# Patient Record
Sex: MALE | Employment: FULL TIME | URBAN - NONMETROPOLITAN AREA
[De-identification: names, ages, dates, MRNs, and addresses within clinical notes are randomized per-mention and may not be internally consistent; named-entity substitution may affect disease eponyms.]

---

## 2024-01-09 ENCOUNTER — OFFICE VISIT (OUTPATIENT)
Dept: URGENT CARE | Facility: CLINIC | Age: 54
End: 2024-01-09

## 2024-01-09 VITALS
OXYGEN SATURATION: 96 % | RESPIRATION RATE: 16 BRPM | DIASTOLIC BLOOD PRESSURE: 60 MMHG | SYSTOLIC BLOOD PRESSURE: 126 MMHG | TEMPERATURE: 98 F | HEART RATE: 61 BPM

## 2024-01-09 DIAGNOSIS — R05.1 ACUTE COUGH: ICD-10-CM

## 2024-01-09 DIAGNOSIS — B34.9 VIRAL SYNDROME: Primary | ICD-10-CM

## 2024-01-09 LAB
SARS-COV-2 AG UPPER RESP QL IA: NEGATIVE
VALID CONTROL: NORMAL

## 2024-01-09 PROCEDURE — 87811 SARS-COV-2 COVID19 W/OPTIC: CPT | Performed by: PHYSICIAN ASSISTANT

## 2024-01-09 PROCEDURE — G0382 LEV 3 HOSP TYPE B ED VISIT: HCPCS | Performed by: PHYSICIAN ASSISTANT

## 2024-01-09 RX ORDER — DEXTROMETHORPHAN HYDROBROMIDE AND PROMETHAZINE HYDROCHLORIDE 15; 6.25 MG/5ML; MG/5ML
5 SYRUP ORAL 4 TIMES DAILY PRN
Qty: 60 ML | Refills: 0 | Status: SHIPPED | OUTPATIENT
Start: 2024-01-09

## 2024-01-09 NOTE — LETTER
January 9, 2024     Patient: Kenna Coleman   YOB: 1970   Date of Visit: 1/9/2024       To Whom it May Concern:    Kenna Coleman was seen in my clinic on 1/9/2024. He may return to work on 01/14/2024 .    If you have any questions or concerns, please don't hesitate to call.         Sincerely,          Pedro Pedersen PA-C        CC: No Recipients

## 2024-01-09 NOTE — PROGRESS NOTES
[facility]        NAME: Kenna Jared Jared is a 53 y.o. male  : 1970    MRN: [id number]  DATE: 2024  TIME: 3:51 PM    Assessment and Plan   Viral syndrome [B34.9]  1. Viral syndrome  promethazine-dextromethorphan (PHENERGAN-DM) 6.25-15 mg/5 mL oral syrup      2. Acute cough  Poct Covid 19 Rapid Antigen Test            Patient Instructions     Patient Instructions   Síndrome viral   CUIDADO AMBULATORIO:   Síndrome viral es un término usado para los síntomas de juan infección causado por un virus. Los virus se propagan fácilmente de juan persona a otra mediante los objetos que se comparten.  Los signos y síntomas podrían empezar de manera lenta o repentina y durar desde horas hasta farhat. Pueden ser de leves a graves y pueden cambiar en un periodo de días u horas. Puede presentar cualquiera de los siguientes signos o síntomas:  Fiebre y escalofríos    Congestión o goteo nasal    Tos, dolor de garganta y voz ronca    Dolor de rufino, o dolor y presión alrededor de rabia ojos    Dolor muscular y de las articulaciones    Falta de aliento o sibilancia    Dolor abdominal, calambres y diarrea    Náuseas, vómitos o pérdida del apetito    Llame al número local de emergencias (911 en los [place]), o pídale a alguien que llame si:  Usted sufre juan convulsión.    No es posible despertarlo.    Usted tiene dolor torácico y dificultad para respirar.    Busque atención médica de inmediato si:  Usted tiene rigidez en el ari, dolor de rufino intenso y sensibilidad a la bryant.    Usted se siente débil, mareado o confundido.    Usted felecia de orinar u orina menos de lo habitual.    Usted tose giuseppe o juan mucosidad espesa amarilla o santa.    Usted tiene dolor abdominal severo o lemus abdomen está más mary de lo habitual.    Llame a lemus médico si:  Rabia síntomas no mejoran con el tratamiento o empeoran después de 3 días.    Usted tiene salpullido o dolor de oído.    Usted siente ardor al  orinar.    Usted tiene preguntas o inquietudes acerca de lemus condición o cuidado.    El tratamiento para el síndrome viral podría incluir medicamentos para controlar caron síntomas. Para juan infección viral, no se administran antibióticos. Es posible que usted necesite alguno de los siguientes:  Acetaminofén digna el dolor y baja la fiebre. Está disponible sin receta médica. Pregunte la cantidad y la frecuencia con que debe tomarlos. Siga las indicaciones. Tana las etiquetas de todos los demás medicamentos que esté usando para saber si también contienen acetaminofén, o pregunte a lemus médico o farmacéutico. El acetaminofén puede causar daño en el hígado cuando no se rajesh de forma correcta.    BUCKY shereen el ibuprofeno, ayudan a disminuir la inflamación, el dolor y la fiebre. Los BUCKY pueden causar sangrado estomacal o problemas renales en ciertas personas. Si usted rajesh un medicamento anticoagulante, siempre pregúntele a lemus médico si los BUCKY son seguros para usted. Siempre tana la etiqueta de iesha medicamento y siga las instrucciones.    El medicamento para el resfriado ayuda a disminuir la inflamación, a controlar la tos y a aliviar la congestión del pecho o nasal.    El rociador nasal de agua salina ayuda a disminuir la congestión nasal.    El manejo de caron síntomas:  [place] líquidos shereen se le indique para evitar juan deshidratación. Pregunte cuánto líquido debe hillary cada día y cuáles líquidos son los más adecuados para usted. No tome líquidos con cafeína. La cafeína puede empeorar la deshidratación.    Descanse lo suficiente para ayudar a que lemus cuerpo sane. [place] siestas rohit el día. Pregunte a lemus médico cuándo puede regresar a lemus trabajo y a caron actividades cotidianas.    Use un humidificador de kassandra frío para aumentar el nivel de humedad en el aire de lemus hogar. [place] podría ayudarle a respirar más fácilmente y al mismo tiempo disminuir la tos.    Polly té con miel o use pastillas para la tos para el dolor de  garganta. Los caramelos para la tos están disponibles sin receta médica. Siga las indicaciones para hillary los caramelos para la tos.    No fume ni esté cerca a alguien que esté fumando. La nicotina y otras sustancias químicas que contienen los cigarrillos y cigarros pueden dañar los pulmones. El fumar también puede retrasar la sanación. Pida información a lemus médico si usted actualmente fuma y necesita ayuda para dejar de fumar. Los cigarrillos electrónicos o el tabaco sin humo igualmente contienen nicotina. Consulte con lemus médico antes de utilizar estos productos.    Prevenga la propagación de gérmenes:  Lávese las beatrice con frecuencia rohit el día. Utilice agua y jabón. [place] las beatrice enjabonadas, entrelazando los dedos, rohit al menos 20 segundos. Enjuáguese con agua corriente caliente. Séquese las beatrice con juan toalla limpia o juan toalla de papel. Puede usar un desinfectante para beatrice que contenga alcohol, si no hay agua y jabón disponibles. Enseñe a los niños a lavarse las beatrice y a usar el desinfectante de beatrice.         Cúbrase al toser y estornudar. Gire la jolanta y cúbrase la boca y la nariz con un pañuelo. Deseche el pañuelo. Use el ángulo del brazo si no tiene un pañuelo disponible. Luego lávese las beatrice con agua y jabón o use un desinfectante de beatrice. Enséñeles a los niños a cubrirse al toser o estornudar.    Quédese en casa mientras esté enfermo. Evite las multitudes lo más que pueda.    Vacúnese contra la influenza (gripe) tan pronto shereen se recomiende cada año. La vacuna antigripal se ofrece a partir de septiembre u octubre. Pregunte a lemus médico acerca de la vacuna contra la neumonía. Esta vacuna generalmente se recomienda cada 5 años en los adultos mayores.       Acuda a la consulta de control con lemus médico según las indicaciones: Anote caron preguntas para que se acuerde de hacerlas rohit caron visitas.  © Copyright Merative 2023 Information is for End User's use only and may not be sold,  redistributed or otherwise used for commercial purposes.  Esta información es sólo para uso en educación. Lemus intención no es darle un consejo médico sobre enfermedades o tratamientos. Colsulte con lemus médico, enfermera o farmacéutico antes de seguir cualquier régimen médico para saber si es seguro y efectivo para usted.        Follow up with PCP in 3-5 days.  Proceed to  ER if symptoms worsen.    Chief Complaint     Chief Complaint   Patient presents with    Diarrhea    Cough    Sore Throat    Fever    Nasal Congestion     Started 3 days ago  OTC cold medication  Request note for work         History of Present Illness       The patient presents the clinic for cough, sore throat, fever, nasal congestion and diarrhea for 3 days.    Diarrhea   Associated symptoms include chills, coughing, a fever, headaches and myalgias. Pertinent negatives include no vomiting.   Cough  Associated symptoms include chills, a fever, headaches, myalgias, postnasal drip, rhinorrhea and a sore throat. Pertinent negatives include no shortness of breath or wheezing.   Sore Throat   Associated symptoms include congestion, coughing, diarrhea and headaches. Pertinent negatives include no shortness of breath or vomiting.   Fever  Associated symptoms include chills, congestion, coughing, a fever, headaches, myalgias and a sore throat. Pertinent negatives include no nausea or vomiting.       Review of Systems   Review of Systems   Constitutional:  Positive for chills and fever.   HENT:  Positive for congestion, postnasal drip, rhinorrhea, sinus pressure, sinus pain and sore throat.    Respiratory:  Positive for cough. Negative for shortness of breath and wheezing.    Gastrointestinal:  Positive for diarrhea. Negative for nausea, rectal pain and vomiting.   Musculoskeletal:  Positive for myalgias.   Neurological:  Positive for headaches.         Current Medications       Current Outpatient Medications:     promethazine-dextromethorphan  (PHENERGAN-DM) 6.25-15 mg/5 mL oral syrup, Take 5 mL by mouth 4 (four) times a day as needed for cough, Disp: 60 mL, Rfl: 0    Current Allergies     Allergies as of 01/09/2024    (No Known Allergies)            The following portions of the patient's history were reviewed and updated as appropriate: allergies, current medications, past family history, past medical history, past social history, past surgical history and problem list.     History reviewed. No pertinent past medical history.    Past Surgical History:   Procedure Laterality Date    SHOULDER SURGERY         History reviewed. No pertinent family history.      Medications have been verified.        Objective   /60   Pulse 61   Temp 98 °F (36.7 °C)   Resp 16   SpO2 96%        Physical Exam     Physical Exam  Constitutional:       Appearance: He is well-developed.   HENT:      Head: Normocephalic.      Right Ear: No drainage.      Left Ear: No drainage.      Nose: Congestion and rhinorrhea present.   Eyes:      General:         Left eye: Discharge present.     Pupils: Pupils are equal, round, and reactive to light.   Neck:      Thyroid: No thyromegaly.      Trachea: No tracheal deviation.   Cardiovascular:      Rate and Rhythm: Normal rate and regular rhythm.      Heart sounds: No murmur heard.  Pulmonary:      Effort: Pulmonary effort is normal. No respiratory distress.      Breath sounds: Rhonchi present. No wheezing or rales.   Chest:      Chest wall: No tenderness.   Abdominal:      General: Bowel sounds are normal. There is no distension.      Palpations: Abdomen is soft. There is no mass.      Tenderness: There is no abdominal tenderness. There is no guarding or rebound.   Musculoskeletal:         General: Normal range of motion.      Cervical back: Normal range of motion.   Skin:     General: Skin is warm.   Neurological:      Mental Status: He is alert.               -Rapid COVID test is negative.  Symptoms likely viral.  I suggest  supportive treatment for now.  Patient will follow-up with PCP or go to the ER if symptoms worsen

## 2024-01-09 NOTE — PATIENT INSTRUCTIONS
Síndrome viral   CUIDADO AMBULATORIO:   Síndrome viral es un término usado para los síntomas de juan infección causado por un virus. Los virus se propagan fácilmente de juan persona a otra mediante los objetos que se comparten.  Los signos y síntomas podrían empezar de manera lenta o repentina y durar desde horas hasta farhat. Pueden ser de leves a graves y pueden cambiar en un periodo de días u horas. Puede presentar cualquiera de los siguientes signos o síntomas:  Fiebre y escalofríos    Congestión o goteo nasal    Tos, dolor de garganta y voz ronca    Dolor de rufino, o dolor y presión alrededor de rabia ojos    Dolor muscular y de las articulaciones    Falta de aliento o sibilancia    Dolor abdominal, calambres y diarrea    Náuseas, vómitos o pérdida del apetito    Llame al número local de emergencias (911 en [place]), o pídale a alguien que llame si:  Usted sufre juan convulsión.    No es posible despertarlo.    Usted tiene dolor torácico y dificultad para respirar.    Busque atención médica de inmediato si:  Usted tiene rigidez en el ari, dolor de rufino intenso y sensibilidad a la bryant.    Usted se siente débil, mareado o confundido.    Usted felecia de orinar u orina menos de lo habitual.    Usted tose giuseppe o ujan mucosidad espesa amarilla o santa.    Usted tiene dolor abdominal severo o lemus abdomen está más mary de lo habitual.    Llame a lemus médico si:  Rabia síntomas no mejoran con el tratamiento o empeoran después de 3 días.    Usted tiene salpullido o dolor de oído.    Usted siente ardor al orinar.    Usted tiene preguntas o inquietudes acerca de lemus condición o cuidado.    El tratamiento para el síndrome viral podría incluir medicamentos para controlar rabia síntomas. Para juan infección viral, no se administran antibióticos. Es posible que usted necesite alguno de los siguientes:  Acetaminofén digna el dolor y baja la fiebre. Está disponible sin receta médica. Pregunte la cantidad y la frecuencia con  que debe tomarlos. Siga las indicaciones. Tana las etiquetas de todos los demás medicamentos que esté usando para saber si también contienen acetaminofén, o pregunte a lemus médico o farmacéutico. El acetaminofén puede causar daño en el hígado cuando no se rajesh de forma correcta.    BUCKY shereen el ibuprofeno, ayudan a disminuir la inflamación, el dolor y la fiebre. Los BUCKY pueden causar sangrado estomacal o problemas renales en ciertas personas. Si usted rajesh un medicamento anticoagulante, siempre pregúntele a lemus médico si los BUCKY son seguros para usted. Siempre tana la etiqueta de iesha medicamento y siga las instrucciones.    El medicamento para el resfriado ayuda a disminuir la inflamación, a controlar la tos y a aliviar la congestión del pecho o nasal.    El rociador nasal de agua salina ayuda a disminuir la congestión nasal.    El manejo de caron síntomas:  [place] líquidos shereen se le indique para evitar juan deshidratación. Pregunte cuánto líquido debe hillary cada día y cuáles líquidos son los más adecuados para usted. No tome líquidos con cafeína. La cafeína puede empeorar la deshidratación.    Descanse lo suficiente para ayudar a que lemus cuerpo sane. [place] siestas rohit el día. Pregunte a lemus médico cuándo puede regresar a lemus trabajo y a caron actividades cotidianas.    Use un humidificador de kassandra frío para aumentar el nivel de humedad en el aire de lemus hogar. [place] podría ayudarle a respirar más fácilmente y al mismo tiempo disminuir la tos.    Polly té con miel o use pastillas para la tos para el dolor de garganta. Los caramelos para la tos están disponibles sin receta médica. Siga las indicaciones para hillary los caramelos para la tos.    No fume ni esté cerca a alguien que esté fumando. La nicotina y otras sustancias químicas que contienen los cigarrillos y cigarros pueden dañar los pulmones. El fumar también puede retrasar la sanación. Pida información a lemus médico si usted actualmente fuma y necesita ayuda para dejar de  fumar. Los cigarrillos electrónicos o el tabaco sin humo igualmente contienen nicotina. Consulte con lemus médico antes de utilizar estos productos.    Prevenga la propagación de gérmenes:  Lávese las beatrice con frecuencia rohit el día. Utilice agua y jabón. [place] las beatrice enjabonadas, entrelazando los dedos, rohit al menos 20 segundos. Enjuáguese con agua corriente caliente. Séquese las beatrice con juan toalla limpia o juan toalla de papel. Puede usar un desinfectante para beatrice que contenga alcohol, si no hay agua y jabón disponibles. Enseñe a los niños a lavarse las beatrice y a usar el desinfectante de beatrice.         Cúbrase al toser y estornudar. Gire la jolanta y cúbrase la boca y la nariz con un pañuelo. Deseche el pañuelo. Use el ángulo del brazo si no tiene un pañuelo disponible. Luego lávese las beatrice con agua y jabón o use un desinfectante de beatrice. Enséñeles a los niños a cubrirse al toser o estornudar.    Quédese en casa mientras esté enfermo. Evite las multitudes lo más que pueda.    Vacúnese contra la influenza (gripe) tan pronto shereen se recomiende cada año. La vacuna antigripal se ofrece a partir de septiembre u octubre. Pregunte a lemus médico acerca de la vacuna contra la neumonía. Esta vacuna generalmente se recomienda cada 5 años en los adultos mayores.       Acuda a la consulta de control con lemus médico según las indicaciones: Anote caron preguntas para que se acuerde de hacerlas rohit caron visitas.  © Copyright Merative 2023 Information is for End User's use only and may not be sold, redistributed or otherwise used for commercial purposes.  Esta información es sólo para uso en educación. Lemus intención no es darle un consejo médico sobre enfermedades o tratamientos. Colsulte con lemus médico, enfermera o farmacéutico antes de seguir cualquier régimen médico para saber si es seguro y efectivo para usted.